# Patient Record
Sex: MALE | Race: WHITE | NOT HISPANIC OR LATINO | Employment: UNEMPLOYED | URBAN - METROPOLITAN AREA
[De-identification: names, ages, dates, MRNs, and addresses within clinical notes are randomized per-mention and may not be internally consistent; named-entity substitution may affect disease eponyms.]

---

## 2018-01-03 ENCOUNTER — OFFICE VISIT (OUTPATIENT)
Dept: URGENT CARE | Facility: CLINIC | Age: 58
End: 2018-01-03
Payer: COMMERCIAL

## 2018-01-03 VITALS
HEIGHT: 69 IN | TEMPERATURE: 97 F | WEIGHT: 214.5 LBS | HEART RATE: 98 BPM | BODY MASS INDEX: 31.77 KG/M2 | SYSTOLIC BLOOD PRESSURE: 122 MMHG | DIASTOLIC BLOOD PRESSURE: 70 MMHG

## 2018-01-03 DIAGNOSIS — K08.89 PAIN, DENTAL: Primary | ICD-10-CM

## 2018-01-03 PROCEDURE — 99999 PR PBB SHADOW E&M-EST. PATIENT-LVL III: CPT | Mod: PBBFAC,,, | Performed by: NURSE PRACTITIONER

## 2018-01-03 PROCEDURE — 99202 OFFICE O/P NEW SF 15 MIN: CPT | Mod: S$GLB,,, | Performed by: NURSE PRACTITIONER

## 2018-01-04 NOTE — PROGRESS NOTES
"Subjective:       Patient ID: Pedro Cage is a 57 y.o. male.    Chief Complaint: Dental Pain    Patient is on dialysis. He received treatment today. He is under the care of a dentist for broken tooth/infection. He was prescribed omnicef and "high dose ibuprofen." He states that the dentist is in the process of getting clearance from the kidney specialist to perform work on his mouth to fix the dental issues/infection. He is awaiting hearing back to see when he will undergo his dental work. No fever. He states that the infection is improving with the antibiotics but is requesting pain medication.     Per , tramadol and hydrocodone were given by MARY on 12/20.      Dental Pain    This is a new problem. The current episode started in the past 7 days. The problem occurs constantly. The problem has been waxing and waning. The pain is at a severity of 10/10. Associated symptoms include facial pain and thermal sensitivity. Pertinent negatives include no difficulty swallowing, fever, oral bleeding or sinus pressure. He has tried NSAIDs (tramadol) for the symptoms. The treatment provided no relief.       /70   Pulse 98   Temp 97.4 °F (36.3 °C) (Tympanic)   Ht 5' 9" (1.753 m)   Wt 97.3 kg (214 lb 8.1 oz)   BMI 31.68 kg/m²     Review of Systems   Constitutional: Negative for activity change, appetite change, chills, diaphoresis, fatigue, fever and unexpected weight change.   HENT: Positive for dental problem. Negative for sinus pressure.    Eyes: Negative.    Respiratory: Negative for apnea, chest tightness, shortness of breath and stridor.    Cardiovascular: Negative for chest pain, palpitations and leg swelling.   Gastrointestinal: Negative.    Endocrine: Negative.    Genitourinary: Negative.    Musculoskeletal: Negative for arthralgias and myalgias.   Skin: Negative for color change, pallor, rash and wound.   Allergic/Immunologic: Negative.    Neurological: Negative for dizziness, facial asymmetry, " light-headedness and headaches.   Hematological: Negative for adenopathy.   Psychiatric/Behavioral: Negative for agitation and behavioral problems.       Objective:      Physical Exam   Constitutional: He is oriented to person, place, and time. He appears well-developed and well-nourished. No distress.   HENT:   Head: Normocephalic and atraumatic.   Nose: Nose normal.   Mouth/Throat: Dental caries present.       Broken/missing tooth on the right per drawing. No obvious abscess formation. Area tender during exam.   Cardiovascular: Normal rate and regular rhythm.    Pulmonary/Chest: Effort normal and breath sounds normal. No respiratory distress.   Neurological: He is alert and oriented to person, place, and time.   Skin: Skin is warm and dry. No rash noted. He is not diaphoretic.   Psychiatric: He has a normal mood and affect. His behavior is normal. Judgment and thought content normal.   Nursing note and vitals reviewed.      Assessment:       1. Pain, dental        Plan:       Pedro was seen today for dental pain.    Diagnoses and all orders for this visit:    Pain, dental    offered tramadol 50mg [id number] per up to date dialysis recommendation until he can see his dentist tomorrow  Patient and wife refuse tramadol. They state that they already have that and it does not work  I explained that he would need to follow up with dentist, Primary Care Physician or Emergency Room for further pain meds as tramadol is the strongest thing I am willing to prescribe him in Urgent Care. Wife then asks for a sleeping pill for him for tonight. I explained that we do not treat insomnia or prescribe sleeping pills in Urgent Care.  Left stable stating that he will contact his dentist tomorrow.

## 2018-03-05 ENCOUNTER — HOSPITAL ENCOUNTER (EMERGENCY)
Facility: HOSPITAL | Age: 58
Discharge: HOME | End: 2018-03-05
Attending: EMERGENCY MEDICINE
Payer: COMMERCIAL

## 2018-03-05 VITALS
HEIGHT: 69 IN | DIASTOLIC BLOOD PRESSURE: 95 MMHG | BODY MASS INDEX: 31.84 KG/M2 | SYSTOLIC BLOOD PRESSURE: 203 MMHG | OXYGEN SATURATION: 100 % | HEART RATE: 97 BPM | WEIGHT: 215 LBS | RESPIRATION RATE: 34 BRPM | TEMPERATURE: 98 F

## 2018-03-05 DIAGNOSIS — I10 HYPERTENSION: ICD-10-CM

## 2018-03-05 DIAGNOSIS — Z99.2 HEMODIALYSIS PATIENT: Primary | ICD-10-CM

## 2018-03-05 DIAGNOSIS — D64.9 ANEMIA, UNSPECIFIED TYPE: ICD-10-CM

## 2018-03-05 DIAGNOSIS — I10 HYPERTENSION, UNSPECIFIED TYPE: ICD-10-CM

## 2018-03-05 DIAGNOSIS — R07.89 ATYPICAL CHEST PAIN: ICD-10-CM

## 2018-03-05 LAB
ALBUMIN SERPL BCP-MCNC: 3.8 G/DL
ALP SERPL-CCNC: 99 U/L
ALT SERPL W/O P-5'-P-CCNC: 21 U/L
ANION GAP SERPL CALC-SCNC: 17 MMOL/L
AST SERPL-CCNC: 15 U/L
BACTERIA #/AREA URNS HPF: ABNORMAL /HPF
BASOPHILS # BLD AUTO: 0.05 K/UL
BASOPHILS NFR BLD: 0.8 %
BILIRUB SERPL-MCNC: 0.6 MG/DL
BILIRUB UR QL STRIP: NEGATIVE
BUN SERPL-MCNC: 49 MG/DL
CALCIUM SERPL-MCNC: 9.2 MG/DL
CHLORIDE SERPL-SCNC: 99 MMOL/L
CLARITY UR: CLEAR
CO2 SERPL-SCNC: 25 MMOL/L
COLOR UR: YELLOW
CREAT SERPL-MCNC: 12.6 MG/DL
DIFFERENTIAL METHOD: ABNORMAL
EOSINOPHIL # BLD AUTO: 0.3 K/UL
EOSINOPHIL NFR BLD: 5.3 %
ERYTHROCYTE [DISTWIDTH] IN BLOOD BY AUTOMATED COUNT: 15 %
EST. GFR  (AFRICAN AMERICAN): 4 ML/MIN/1.73 M^2
EST. GFR  (NON AFRICAN AMERICAN): 4 ML/MIN/1.73 M^2
GLUCOSE SERPL-MCNC: 91 MG/DL
GLUCOSE UR QL STRIP: ABNORMAL
HCT VFR BLD AUTO: 38.8 %
HGB BLD-MCNC: 12.7 G/DL
HGB UR QL STRIP: ABNORMAL
HYALINE CASTS #/AREA URNS LPF: 0 /LPF
KETONES UR QL STRIP: ABNORMAL
LEUKOCYTE ESTERASE UR QL STRIP: NEGATIVE
LYMPHOCYTES # BLD AUTO: 1.1 K/UL
LYMPHOCYTES NFR BLD: 16.8 %
MCH RBC QN AUTO: 32 PG
MCHC RBC AUTO-ENTMCNC: 32.7 G/DL
MCV RBC AUTO: 98 FL
MICROSCOPIC COMMENT: ABNORMAL
MONOCYTES # BLD AUTO: 0.4 K/UL
MONOCYTES NFR BLD: 5.4 %
NEUTROPHILS # BLD AUTO: 4.6 K/UL
NEUTROPHILS NFR BLD: 71.7 %
NITRITE UR QL STRIP: NEGATIVE
PH UR STRIP: 8 [PH] (ref 5–8)
PLATELET # BLD AUTO: 169 K/UL
PMV BLD AUTO: 10.3 FL
POTASSIUM SERPL-SCNC: 5.4 MMOL/L
PROT SERPL-MCNC: 7.3 G/DL
PROT UR QL STRIP: ABNORMAL
RBC # BLD AUTO: 3.97 M/UL
RBC #/AREA URNS HPF: 5 /HPF (ref 0–4)
SODIUM SERPL-SCNC: 141 MMOL/L
SP GR UR STRIP: 1.02 (ref 1–1.03)
TROPONIN I SERPL DL<=0.01 NG/ML-MCNC: 0.05 NG/ML
TROPONIN I SERPL DL<=0.01 NG/ML-MCNC: 0.07 NG/ML
URN SPEC COLLECT METH UR: ABNORMAL
UROBILINOGEN UR STRIP-ACNC: NEGATIVE EU/DL
WBC # BLD AUTO: 6.47 K/UL
WBC #/AREA URNS HPF: 5 /HPF (ref 0–5)

## 2018-03-05 PROCEDURE — 96366 THER/PROPH/DIAG IV INF ADDON: CPT

## 2018-03-05 PROCEDURE — 99284 EMERGENCY DEPT VISIT MOD MDM: CPT | Mod: 25

## 2018-03-05 PROCEDURE — 96375 TX/PRO/DX INJ NEW DRUG ADDON: CPT

## 2018-03-05 PROCEDURE — 96365 THER/PROPH/DIAG IV INF INIT: CPT

## 2018-03-05 PROCEDURE — 25000003 PHARM REV CODE 250: Performed by: EMERGENCY MEDICINE

## 2018-03-05 PROCEDURE — 93010 ELECTROCARDIOGRAM REPORT: CPT | Mod: ,,, | Performed by: INTERNAL MEDICINE

## 2018-03-05 PROCEDURE — 63600175 PHARM REV CODE 636 W HCPCS: Performed by: EMERGENCY MEDICINE

## 2018-03-05 PROCEDURE — 85025 COMPLETE CBC W/AUTO DIFF WBC: CPT

## 2018-03-05 PROCEDURE — 81000 URINALYSIS NONAUTO W/SCOPE: CPT

## 2018-03-05 PROCEDURE — 80053 COMPREHEN METABOLIC PANEL: CPT

## 2018-03-05 PROCEDURE — 84484 ASSAY OF TROPONIN QUANT: CPT

## 2018-03-05 RX ORDER — TRAMADOL HYDROCHLORIDE 50 MG/1
50 TABLET ORAL EVERY 6 HOURS PRN
COMMUNITY
End: 2020-01-06 | Stop reason: CLARIF

## 2018-03-05 RX ORDER — OXYCODONE AND ACETAMINOPHEN 10; 325 MG/1; MG/1
1 TABLET ORAL
Status: COMPLETED | OUTPATIENT
Start: 2018-03-05 | End: 2018-03-05

## 2018-03-05 RX ORDER — FUROSEMIDE 10 MG/ML
40 INJECTION INTRAMUSCULAR; INTRAVENOUS
Status: COMPLETED | OUTPATIENT
Start: 2018-03-05 | End: 2018-03-05

## 2018-03-05 RX ORDER — LABETALOL HYDROCHLORIDE 5 MG/ML
20 INJECTION, SOLUTION INTRAVENOUS
Status: COMPLETED | OUTPATIENT
Start: 2018-03-05 | End: 2018-03-05

## 2018-03-05 RX ORDER — HYDRALAZINE HYDROCHLORIDE 20 MG/ML
10 INJECTION INTRAMUSCULAR; INTRAVENOUS
Status: COMPLETED | OUTPATIENT
Start: 2018-03-05 | End: 2018-03-05

## 2018-03-05 RX ADMIN — HYDRALAZINE HYDROCHLORIDE 10 MG: 20 INJECTION INTRAMUSCULAR; INTRAVENOUS at 02:03

## 2018-03-05 RX ADMIN — LABETALOL HYDROCHLORIDE 20 MG: 5 INJECTION, SOLUTION INTRAVENOUS at 04:03

## 2018-03-05 RX ADMIN — PROMETHAZINE HYDROCHLORIDE 12.5 MG: 25 INJECTION INTRAMUSCULAR; INTRAVENOUS at 05:03

## 2018-03-05 RX ADMIN — PROMETHAZINE HYDROCHLORIDE 12.5 MG: 25 INJECTION INTRAMUSCULAR; INTRAVENOUS at 08:03

## 2018-03-05 RX ADMIN — FUROSEMIDE 40 MG: 10 INJECTION, SOLUTION INTRAMUSCULAR; INTRAVENOUS at 02:03

## 2018-03-05 RX ADMIN — OXYCODONE HYDROCHLORIDE AND ACETAMINOPHEN 1 TABLET: 10; 325 TABLET ORAL at 08:03

## 2018-03-05 NOTE — ED PROVIDER NOTES
"SCRIBE #1 NOTE: I, Domingo Good, am scribing for, and in the presence of, Gonzalo Escoto MD. I have scribed the HPI, ROS, and PEx.     SCRIBE #2 NOTE: I, Dwight Erickson, am scribing for, and in the presence of,  Carina Boland DO. I have scribed the remaining portions of the note not scribed by Scribe #1.     History      Chief Complaint   Patient presents with    Hypertension     Pt complains that he just doesnt feel right, n/v, pt going to dialysis in the morning but "does not think he will make it until then"       Review of patient's allergies indicates:   Allergen Reactions    Ace inhibitors      Other reaction(s): hyperkalemia    Arb-angiotensin receptor antagonist      Other reaction(s): Hyperkalemia    Clonazepam      Other reaction(s): Hallucinations        HPI   HPI    3/5/2018, 12:38 AM   History obtained from the patient      History of Present Illness: Pedro Cage is a 57 y.o. male patient who presents to the Emergency Department for an evaluation of hypertension which onset gradually yesterday. [place] levels and generalized muscle contractions after dialyzing Friday. After being discharged yesterday pt has reportedly been fatigued and suffering from episodes of anxiety when trying to sleep which prompted his wife to bring him in for an evaluation. Symptoms are intermittent and moderate in severity. Exacerbated by nothing and relieved by nothing. Associated sxs include chest tightness, fatigue, sleep disturbances, and anxiety. Patient denies any fever, chills, SOB, N/V, abd pain, dysuria, hematuria, and all other sxs at this time. Pt denies tx PTA No further complaints or concerns at this time.     Pt dialyzes MWF.       Arrival mode: Personal vehicle    PCP: Abhishek Nicole MD       Past Medical History:  Past Medical History:   Diagnosis Date    Anemia of renal disease     Anemia, unspecified     Blindness     " Chronic back pain     Diastolic CHF, chronic     Esophageal reflux     ESRD on dialysis since 2/18/14     Gastroparesis diabeticorum     Hypertension     Lumbago     Secondary hyperparathyroidism of renal origin     Type 2 diabetes mellitus with diabetic nephropathy     Type II or unspecified type diabetes mellitus with renal manifestations, not stated as uncontrolled(250.40)        Past Surgical History:  Past Surgical History:   Procedure Laterality Date    AV FISTULA PLACEMENT  Aug 2015    CHOLECYSTECTOMY  ~2012    EYE SURGERY Right     right eye enucleation in May 2015 and prosthetic placement in July 2015    PERITONEAL CATHETER INSERTION  2/21/14    PERITONEAL CATHETER REMOVAL  8/12/15    concern for peritonitis vs tunnel/exit site infection as well as catheter not functioning well13         Family History:  Family History   Problem Relation Age of Onset    Diabetes Mother     Breast cancer Mother      diagnosed in her 60s    Hypertension Mother      one time; but controlled per patient    Kidney disease Neg Hx     Coronary artery disease Neg Hx     Stroke Neg Hx     Hyperlipidemia Neg Hx     Heart failure Neg Hx     Heart disease Neg Hx     Heart attack Neg Hx        Social History:  Social History     Social History Main Topics    Smoking status: Former Smoker    Smokeless tobacco: Never Used      Comment: quit in 1986; smoked for about 10 years and at the most smoked 2 ppd    Alcohol use No      Comment: previous social drinker    Drug use: No      Comment: previous marijuana use in his youth    Sexual activity: Unknown       ROS   Review of Systems   Constitutional: Positive for fatigue. Negative for chills, diaphoresis and fever.        (+) Hypertensive   HENT: Negative for congestion and sore throat.    Respiratory: Positive for chest tightness. Negative for cough and shortness of breath.    Cardiovascular: Negative for chest pain, palpitations and leg swelling.    Gastrointestinal: Negative for abdominal pain, nausea and vomiting.   Genitourinary: Negative for decreased urine volume, difficulty urinating, dysuria, frequency, hematuria and urgency.   Musculoskeletal: Negative for back pain and neck pain.   Skin: Negative for rash and wound.   Neurological: Negative for dizziness, weakness, light-headedness, numbness and headaches.   Hematological: Does not bruise/bleed easily.   Psychiatric/Behavioral: Positive for sleep disturbance. Negative for agitation and confusion. The patient is nervous/anxious.      Physical Exam      Initial Vitals   BP Pulse Resp Temp SpO2   03/05/18 0036 03/05/18 0036 03/05/18 0036 03/05/18 0037 03/05/18 0036   (!) 205/108 98 18 98.1 °F (36.7 °C) 98 %      MAP       03/05/18 0036       140.33          Physical Exam  Nursing Notes and Vital Signs Reviewed.  Constitutional: Patient is in no acute distress. Well-developed and well-nourished.  Head: Atraumatic. Normocephalic.  Eyes: PERRL. EOM intact. Conjunctivae are not pale. No scleral icterus.  ENT: Mucous membranes are moist. Oropharynx is clear and symmetric.    Neck: Supple. Full ROM. No lymphadenopathy.  Cardiovascular: Regular rate. Regular rhythm. Crescendo decrescendo murmur which was noted to be strongest at right upper sternal border.  Pulmonary/Chest: No respiratory distress. Clear to auscultation bilaterally. No wheezing or rales.  Abdominal: Soft. Protuberant abdomen. There is no tenderness.   Musculoskeletal: Moves all extremities. No obvious deformities. No edema. No calf tenderness.  Skin: Warm and dry.  Neurological:  Alert, awake, and appropriate.  Normal speech.  No acute focal neurological deficits are appreciated.  Psychiatric: Normal affect. Good eye contact. Appropriate in content.    ED Course    Procedures  ED Vital Signs:  Vitals:    03/05/18 0130 03/05/18 0244 03/05/18 0245 03/05/18 0252   BP:  (!) 206/106 (!) 206/106 (!) 197/107   Pulse: 96  92 96   Resp:   19 16   Temp:        TempSrc:       SpO2:   96% 97%   Weight:       Height:        03/05/18 0332 03/05/18 0500 03/05/18 0504 03/05/18 0508   BP: (!) 194/98 (!) 209/114 (!) 177/83    Pulse: 91 101 89    Resp: 18 20 20    Temp:    98.1 °F (36.7 °C)   TempSrc:    Oral   SpO2: 98% 97% 95%    Weight:       Height:        03/05/18 0547 03/05/18 0603 03/05/18 0632 03/05/18 0716   BP: (!) 185/93 (!) 183/93 (!) 182/93 (!) 186/95   Pulse: 84 84 86 90   Resp: 20 20 12 11   Temp:       TempSrc:       SpO2: 96% 96% 97% 97%   Weight:       Height:        03/05/18 0731 03/05/18 0803 03/05/18 0846   BP: (!) 178/81 (!) 193/95 (!) 203/95   Pulse: 90 94 97   Resp: 20 (!) 31 (!) 34   Temp:      TempSrc:      SpO2: 96% 97% 100%   Weight:      Height:          Abnormal Lab Results:  Labs Reviewed   CBC W/ AUTO DIFFERENTIAL - Abnormal; Notable for the following:        Result Value    RBC 3.97 (*)     Hemoglobin 12.7 (*)     Hematocrit 38.8 (*)     MCH 32.0 (*)     RDW 15.0 (*)     Lymph% 16.8 (*)     All other components within normal limits   COMPREHENSIVE METABOLIC PANEL - Abnormal; Notable for the following:     Potassium 5.4 (*)     BUN, Bld 49 (*)     Creatinine 12.6 (*)     Anion Gap 17 (*)     eGFR if  4 (*)     eGFR if non  4 (*)     All other components within normal limits   TROPONIN I - Abnormal; Notable for the following:     Troponin I 0.072 (*)     All other components within normal limits   URINALYSIS - Abnormal; Notable for the following:     Protein, UA 3+ (*)     Glucose, UA 1+ (*)     Ketones, UA Trace (*)     Occult Blood UA 1+ (*)     All other components within normal limits   URINALYSIS MICROSCOPIC - Abnormal; Notable for the following:     RBC, UA 5 (*)     All other components within normal limits   TROPONIN I - Abnormal; Notable for the following:     Troponin I 0.052 (*)     All other components within normal limits   B-TYPE NATRIURETIC PEPTIDE   B-TYPE NATRIURETIC PEPTIDE        All Lab  Results:  Results for orders placed or performed during the hospital encounter of 03/05/18   CBC auto differential   Result Value Ref Range    WBC 6.47 3.90 - 12.70 K/uL    RBC 3.97 (L) 4.60 - 6.20 M/uL    Hemoglobin 12.7 (L) 14.0 - 18.0 g/dL    Hematocrit 38.8 (L) 40.0 - 54.0 %    MCV 98 82 - 98 fL    MCH 32.0 (H) 27.0 - 31.0 pg    MCHC 32.7 32.0 - 36.0 g/dL    RDW 15.0 (H) 11.5 - 14.5 %    Platelets 169 150 - 350 K/uL    MPV 10.3 9.2 - 12.9 fL    Gran # (ANC) 4.6 1.8 - 7.7 K/uL    Lymph # 1.1 1.0 - 4.8 K/uL    Mono # 0.4 0.3 - 1.0 K/uL    Eos # 0.3 0.0 - 0.5 K/uL    Baso # 0.05 0.00 - 0.20 K/uL    Gran% 71.7 38.0 - 73.0 %    Lymph% 16.8 (L) 18.0 - 48.0 %    Mono% 5.4 4.0 - 15.0 %    Eosinophil% 5.3 0.0 - 8.0 %    Basophil% 0.8 0.0 - 1.9 %    Differential Method Automated    Comprehensive metabolic panel   Result Value Ref Range    Sodium 141 136 - 145 mmol/L    Potassium 5.4 (H) 3.5 - 5.1 mmol/L    Chloride 99 95 - 110 mmol/L    CO2 25 23 - 29 mmol/L    Glucose 91 70 - 110 mg/dL    BUN, Bld 49 (H) 6 - 20 mg/dL    Creatinine 12.6 (H) 0.5 - 1.4 mg/dL    Calcium 9.2 8.7 - 10.5 mg/dL    Total Protein 7.3 6.0 - 8.4 g/dL    Albumin 3.8 3.5 - 5.2 g/dL    Total Bilirubin 0.6 0.1 - 1.0 mg/dL    Alkaline Phosphatase 99 55 - 135 U/L    AST 15 10 - 40 U/L    ALT 21 10 - 44 U/L    Anion Gap 17 (H) 8 - 16 mmol/L    eGFR if African American 4 (A) >60 mL/min/1.73 m^2    eGFR if non African American 4 (A) >60 mL/min/1.73 m^2   Troponin I   Result Value Ref Range    Troponin I 0.072 (H) 0.000 - 0.026 ng/mL   Urinalysis   Result Value Ref Range    Specimen UA Urine, Clean Catch     Color, UA Yellow Yellow, Straw, Antonella    Appearance, UA Clear Clear    pH, UA 8.0 5.0 - 8.0    Specific Gravity, UA 1.020 1.005 - 1.030    Protein, UA 3+ (A) Negative    Glucose, UA 1+ (A) Negative    Ketones, UA Trace (A) Negative    Bilirubin (UA) Negative Negative    Occult Blood UA 1+ (A) Negative    Nitrite, UA Negative Negative    Urobilinogen, UA  Negative <2.0 EU/dL    Leukocytes, UA Negative Negative   Urinalysis Microscopic   Result Value Ref Range    RBC, UA 5 (H) 0 - 4 /hpf    WBC, UA 5 0 - 5 /hpf    Bacteria, UA Occasional None-Occ /hpf    Hyaline Casts, UA 0 0-1/lpf /lpf    Microscopic Comment SEE COMMENT    Troponin I   Result Value Ref Range    Troponin I 0.052 (H) 0.000 - 0.026 ng/mL     Results for AMRYLIN HUFFMAN (MRN [id number]) as of 3/5/2018 08:53   Ref. Range 3/5/2018 01:24 3/5/2018 02:44 3/5/2018 07:50   Troponin I Latest Ref Range: 0.000 - 0.026 ng/mL 0.072 (H)  0.052 (H)       Imaging Results:     Per ED physician, pt's CXR results: Diffuse interstitial edema. No infiltrate or effusion noted.         The EKG was ordered, reviewed, and independently interpreted by the ED provider.  Interpretation time: 0:42  Rate: 98 BPM  Rhythm: normal sinus rhythm  Interpretation: No STEMI.        The Emergency Provider reviewed the vital signs and test results, which are outlined above.    ED Discussion     4:29 AM: Dr. Escoto discussed the pt's case with Dr. Anaya (Nephrology) who recommends treating pt's blood pressure in ED and will evaluate pt in the ED.    5:00 AM: Dr. Escoto transfers care of pt to Dr. Boland, pending repeat troponin results at 0730 and nephrology evaluation.  Hydralazine and now labetalol have been given for ongoing HTN pending expected HD.  Patient remains free of signs of HTN emergency at this time.    5:22 AM: Dr. Boland discussed the pt's case with Dr. Anaya (Nephrology) who recommends contacting pt's nephrology providers, [facility], and admit pt in order for treatment to continue.    5:34 AM: Pt's troponin was 0.6 when he was seen at [facility] x2 days ago when he was admitted.    5:48 AM: Re-evaluated pt. Pt is resting comfortably and is in no acute distress.  Pt was sitting up in bed and stated that he was a little nauseated, but otherwise feeling better. Upon re-examination of pt, he had crackles to  bilateral bases. I also discussed my plan to consult pt's nephrology group, [facility], concerning further plan of care. D/w pt all pertinent results. D/w pt any concerns expressed at this time. Answered all questions. Pt expresses understanding at this time.    7:58 AM: Dr. Boland discussed the pt's case with Dr. Harris (Cardiology) who agrees with plan of repeat troponin.    8:57 AM: Dr. Boland discussed the pt's case with Dr. Harris (Cardiology) who will see pt in clinic tomorrow at 9 AM.  Troponin level decreasing.  It OLOL, troponin baseline is 0.06  Results for MARYLIN HUFFMAN (MRN [id number]) as of 3/5/2018 17:22   Ref. Range 3/5/2018 01:24 3/5/2018 02:44 3/5/2018 07:50   Troponin I Latest Ref Range: 0.000 - 0.026 ng/mL 0.072 (H)  0.052 (H)     9:14 AM: Dr. Boland discussed the pt's case with Dr. Mckeon ([facility]) who recommends pt need to dialyze today at [facility] following dischagre.    9:30 AM: Reassessed pt. Pt states their condition has improved at this time.  Patient does not have any signs of numbness or weakness to one side, slurred speech, chest pressure to suggest hypertensive emergency.  Patient does not require inpatient dialysis as potassium level is mildly elevated, pulse ox on room air is normal, no severe shortness of breath.  Patient reports that he'll go directly to dialysis.  Daughter verbalizes understanding.  Discussed with pt all pertinent ED information and results. Discussed plan of treatment with pt. Pt instructed to report to dialysis center today following discharge. Pt instructed to f/u with cardiology tomorrow as scheduled. Gave pt all f/u and return to the ED instructions. All questions and concerns were addressed at this time. Pt understands and agrees to plan as discussed. Pt is stable for discharge.       I have discussed with patient and/or family/caretaker chest pain precautions, specifically to return for worsening chest pain, shortness of  breath, fever, or any concern.  I have low suspicion for cardiopulmonary, vascular, infectious, respiratory, or other emergent medical condition based on my evaluation in the ED.      ED Medication(s):  Medications   furosemide injection 40 mg (40 mg Intravenous Given 3/5/18 0243)   hydrALAZINE injection 10 mg (10 mg Intravenous Given 3/5/18 0244)   labetalol injection 20 mg (20 mg Intravenous Given 3/5/18 0458)   promethazine (PHENERGAN) 12.5 mg in dextrose 5 % 50 mL IVPB (0 mg Intravenous Stopped 3/5/18 0626)   promethazine (PHENERGAN) 12.5 mg in dextrose 5 % 50 mL IVPB (0 mg Intravenous Stopped 3/5/18 0845)   oxyCODONE-acetaminophen  mg per tablet 1 tablet (1 tablet Oral Given 3/5/18 0818)       New Prescriptions    No medications on file       Follow-up Information     Juan F Mccoy MD.    Specialty:  Cardiology  Why:  Follow up on Tuesday, March 6 , 2018 at 9:00 AM.    Return to the ED for:   difficutly breathing, chest pain, chest pressure, shortness of breath or other concerns.  Contact information:  [address] [address] [phone]             [facility] to [facility] after discharge from the ED.                   Medical Decision Making    Medical Decision Making:   Clinical Tests:   Lab Tests: Ordered and Reviewed  Radiological Study: Ordered and Reviewed  Medical Tests: Ordered and Reviewed           Scribe Attestation:   Scribe #1: I performed the above scribed service and the documentation accurately describes the services I performed. I attest to the accuracy of the note.    Attending:   Physician Attestation Statement for Scribe #1: I, Gonzalo Escoto MD, personally performed the services described in this documentation, as scribed by Domingo Good, in my presence, and it is both accurate and complete.       Scribe Attestation:   Scribe #2: I performed the above scribed service and the documentation accurately describes the services I performed. I attest to  the accuracy of the note.    Attending Attestation:           Physician Attestation for Scribe:    Physician Attestation Statement for Scribe #2: I, Carina Boland DO, reviewed documentation, as scribed by Dwight Almendarez in my presence, and it is both accurate and complete. I also acknowledge and confirm the content of the note done by Scribe #1.          Clinical Impression       ICD-10-CM ICD-9-CM   1. Hemodialysis patient Z99.2 V45.11   2. Hypertension I10 401.9   3. Atypical chest pain R07.89 786.59   4. Hypertension, unspecified type I10 401.9   5. Anemia, unspecified type D64.9 285.9       Disposition:   Disposition: Discharged  Condition: Stable         Carina Boland DO  03/05/18 1727

## 2018-03-05 NOTE — ED NOTES
Pt found sitting up on side of the bed, awake, alert and oriented x 3, wife at bedside. Pt c/o chronic back pain. Skin warm and dry, respirations even non-labored BBS-CTA, abd soft non-tender, normal BS. Pulses, motion and sensation intact all ext. Pt wife worried about pt receiving his dialysis and how long tests were going to take. Cherri CHANEY at bedside discussing plan of care. CM in place showing SR, rate of 94. Waiting test results.

## 2018-03-05 NOTE — PLAN OF CARE
Chart reviewed: All records from [facility] dialysis reviewed.  Patient is established with ESRD on hemodialysis since 2014.  [facility] of [facility] in [facility] dialysis unit in [facility] on a Monday Wednesday Friday schedule.  Last dialysis was 3/2/18 when he presented with a weight of 99 kg (96 kg with a blood pressure 181 systolic.  His current nephrologist is Dr. Erich Wilkinson     Currently the patient is in the hospital in the emergency room.  Case discussed with emergency room physician Dr. Boland.  If the patient gets admitted and needs a consultation, I will be happy to provide consultation services.  From the emergency room, [facility] will have to be contacted for further plans of care if the patient is going home.        Ruth Anaya MD

## 2018-03-05 NOTE — ED NOTES
Spoke with pt and spouse regarding POC, explained need for repeat troponin, possible admission and plan for dialysis. Pt and spouse state understanding. No other needs at this time. Primary nurse at BS.

## 2020-01-06 PROBLEM — Z78.9 PROBLEM WITH VASCULAR ACCESS: Status: ACTIVE | Noted: 2020-01-06
